# Patient Record
Sex: FEMALE | Race: WHITE
[De-identification: names, ages, dates, MRNs, and addresses within clinical notes are randomized per-mention and may not be internally consistent; named-entity substitution may affect disease eponyms.]

---

## 2019-10-08 ENCOUNTER — HOSPITAL ENCOUNTER (EMERGENCY)
Dept: HOSPITAL 18 - NAV ERS | Age: 44
Discharge: HOME | End: 2019-10-08
Payer: SELF-PAY

## 2019-10-08 DIAGNOSIS — M54.16: Primary | ICD-10-CM

## 2019-10-08 DIAGNOSIS — F17.210: ICD-10-CM

## 2019-10-08 DIAGNOSIS — R10.2: ICD-10-CM

## 2019-10-08 LAB
ANION GAP SERPL CALC-SCNC: 16 MMOL/L (ref 10–20)
BASOPHILS # BLD AUTO: 0 THOU/UL (ref 0–0.2)
BASOPHILS NFR BLD AUTO: 0.5 % (ref 0–1)
BUN SERPL-MCNC: 8 MG/DL (ref 7–18.7)
CALCIUM SERPL-MCNC: 9.3 MG/DL (ref 7.8–10.44)
CHLORIDE SERPL-SCNC: 104 MMOL/L (ref 98–107)
CO2 SERPL-SCNC: 26 MMOL/L (ref 22–29)
CREAT CL PREDICTED SERPL C-G-VRATE: 0 ML/MIN (ref 70–130)
EOSINOPHIL # BLD AUTO: 0.1 THOU/UL (ref 0–0.7)
EOSINOPHIL NFR BLD AUTO: 0.9 % (ref 0–10)
GLUCOSE SERPL-MCNC: 90 MG/DL (ref 70–105)
HGB BLD-MCNC: 13.7 G/DL (ref 12–16)
LYMPHOCYTES # BLD AUTO: 2.4 THOU/UL (ref 1.2–3.4)
LYMPHOCYTES NFR BLD AUTO: 22.5 % (ref 21–51)
MCH RBC QN AUTO: 28.9 PG (ref 27–31)
MCV RBC AUTO: 90.6 FL (ref 78–98)
MONOCYTES # BLD AUTO: 0.6 THOU/UL (ref 0.11–0.59)
MONOCYTES NFR BLD AUTO: 5.8 % (ref 0–10)
NEUTROPHILS # BLD AUTO: 7.6 THOU/UL (ref 1.4–6.5)
NEUTROPHILS NFR BLD AUTO: 70.3 % (ref 42–75)
PLATELET # BLD AUTO: 370 THOU/UL (ref 130–400)
POTASSIUM SERPL-SCNC: 3.7 MMOL/L (ref 3.5–5.1)
PREGU CONTROL BACKGROUND?: (no result)
PREGU CONTROL BAR APPEAR?: YES
RBC # BLD AUTO: 4.73 MILL/UL (ref 4.2–5.4)
SODIUM SERPL-SCNC: 142 MMOL/L (ref 136–145)
WBC # BLD AUTO: 10.8 THOU/UL (ref 4.8–10.8)
WBC UR QL AUTO: (no result) HPF (ref 0–3)

## 2019-10-08 PROCEDURE — 81025 URINE PREGNANCY TEST: CPT

## 2019-10-08 PROCEDURE — 74176 CT ABD & PELVIS W/O CONTRAST: CPT

## 2019-10-08 PROCEDURE — 96372 THER/PROPH/DIAG INJ SC/IM: CPT

## 2019-10-08 PROCEDURE — 81015 MICROSCOPIC EXAM OF URINE: CPT

## 2019-10-08 PROCEDURE — 81003 URINALYSIS AUTO W/O SCOPE: CPT

## 2019-10-08 PROCEDURE — 80048 BASIC METABOLIC PNL TOTAL CA: CPT

## 2019-10-08 PROCEDURE — 85025 COMPLETE CBC W/AUTO DIFF WBC: CPT

## 2019-10-08 NOTE — CT
CT ABDOMEN AND PELVIS WITHOUT CONTRAST:

 

Date;  10/08/19 

 

HISTORY:  

Right flank pain radiating to right lower quadrant. Nausea and vomiting. 

 

FINDINGS:

Absence of oral and IV contrast reduces the sensitivity of exam, particularly for evaluation of solid
 organs and bowel. There are minimal dependent changes in the lung bases. No free air is seen in the 
abdomen or pelvis. No calcified gallstones are noted. A tiny amount of free fluid is noted in the pel
vis. Uterus and ovaries are present. There is colonic diverticulosis. 

 

No calculi are seen in the kidneys, ureters, or the urinary bladder. No hydroureteronephrosis seen on
 either side. 

 

There are vascular calcifications without evidence of aneurysmal dilatation of the abdominal aorta. A
 normal appearing appendix is seen. There are degenerative changes in the spine. 

 

IMPRESSION: 

1.  No CT evidence of urinary tract calculi or obstruction. 

2.  Colonic diverticulosis. 

3.  Tiny amount of free fluid in the pelvis. 

 

 

POS: OFF